# Patient Record
Sex: FEMALE | Race: BLACK OR AFRICAN AMERICAN
[De-identification: names, ages, dates, MRNs, and addresses within clinical notes are randomized per-mention and may not be internally consistent; named-entity substitution may affect disease eponyms.]

---

## 2017-07-13 ENCOUNTER — HOSPITAL ENCOUNTER (OUTPATIENT)
Dept: HOSPITAL 61 - MAMMO | Age: 67
Discharge: HOME | End: 2017-07-13
Attending: INTERNAL MEDICINE
Payer: MEDICARE

## 2017-07-13 DIAGNOSIS — Z12.31: Primary | ICD-10-CM

## 2017-07-14 NOTE — RAD
DATE: 7/13/2017



EXAM: DIGITAL SCREEN BILAT W/CAD



HISTORY: Routine screening



COMPARISON: 7/12/2016



This study was interpreted with the benefit of Computerized Aided Detection

(CAD).





The breast parenchyma is heterogeneously dense, which could reduce sensitivity

of mammography. Breast parenchyma level C.





FINDINGS: There is an unchanged nodule anterolaterally in the left compatible

with an intramammary lymph node. No new or enlarging breast densities are

seen. No suspicious microcalcifications have developed.  





IMPRESSION: Stable mammograms without evidence of malignancy.





BI-RADS CATEGORY: 2 BENIGN FINDING(S)



RECOMMENDED FOLLOW-UP: 12M 12 MONTH FOLLOW-UP



PQRS compliance statement: Patient information was entered into a reminder

system with a target due date     for the next mammogram.



Mammography is a sensitive method for finding small breast cancers, but it

does not detect them all and is not a substitute for careful clinical

examination.  A negative mammogram does not negate a clinically suspicious

finding and should not result in delay in biopsying a clinically suspicious

abnormality.



"Our facility is accredited by the [employer] Mammography

Program."

## 2018-07-17 ENCOUNTER — HOSPITAL ENCOUNTER (OUTPATIENT)
Dept: HOSPITAL 61 - MAMMO | Age: 68
Discharge: HOME | End: 2018-07-17
Attending: INTERNAL MEDICINE
Payer: MEDICARE

## 2018-07-17 DIAGNOSIS — Z12.31: Primary | ICD-10-CM

## 2018-07-17 PROCEDURE — 77067 SCR MAMMO BI INCL CAD: CPT

## 2019-08-14 ENCOUNTER — HOSPITAL ENCOUNTER (OUTPATIENT)
Dept: HOSPITAL 61 - MAMMO | Age: 69
Discharge: HOME | End: 2019-08-14
Attending: FAMILY MEDICINE
Payer: MEDICARE

## 2019-08-14 DIAGNOSIS — N64.89: ICD-10-CM

## 2019-08-14 DIAGNOSIS — N63.22: Primary | ICD-10-CM

## 2019-08-14 PROCEDURE — 77065 DX MAMMO INCL CAD UNI: CPT

## 2019-08-14 PROCEDURE — 76641 ULTRASOUND BREAST COMPLETE: CPT

## 2020-02-26 ENCOUNTER — HOSPITAL ENCOUNTER (OUTPATIENT)
Dept: HOSPITAL 61 - MAMMO | Age: 70
End: 2020-02-26
Attending: FAMILY MEDICINE
Payer: MEDICARE

## 2020-02-26 DIAGNOSIS — N63.20: ICD-10-CM

## 2020-02-26 DIAGNOSIS — N60.42: Primary | ICD-10-CM

## 2020-02-26 DIAGNOSIS — R92.8: ICD-10-CM

## 2020-02-26 PROCEDURE — 77065 DX MAMMO INCL CAD UNI: CPT

## 2020-02-26 PROCEDURE — 76641 ULTRASOUND BREAST COMPLETE: CPT

## 2020-02-26 NOTE — RAD
Examination:

1. Left digital diagnostic mammogram.

2. Limited left breast ultrasound.



INDICATION: 69-year-old woman presents for short-term follow-up of probably

benign asymmetry and nodularity in the left breast.



COMPARISON: Screening mammograms of 7/22/2019, 7/17/2018, 7/13/2017, and

2/6/2014, and left diagnostic mammogram and targeted breast ultrasound of

8/14/2019.



FINDINGS:



Additional left diagnostic mammographic views show extremely dense breast

parenchyma with a questionable asymmetry in the superior left breast at the

12:00 position 5 cm from the nipple on the MLO view only. This could coincide

with the asymmetry recalled from screening last year. It is better visualized

on the 3-D screening mammographic images of 7/22/2019 (CC image 18 of 45 and

MLO image 19 of 47).



Targeted ultrasound of the left breast confirm the presence of an oval mass in

parallel orientation of the left 9:00 position 4 cm from the nipple measuring

2 cm in length with an echogenic linear center, compatible with a benign mass

such as a hamartoma. With focused retrospective review, it is not

significantly changed in at least 6 years. This is consistent with a benign

etiology and requires no further follow-up.



Targeted ultrasound in the lateral left breast identifies a 6 mm oval mass at

the left 3:00 position 5 cm from the nipple that also shows mammographic

stability dating back to 2014 and is most compatible with a benign

intramammary lymph node. This also requires no additional follow-up in the

absence of any clinically suspicious features.



Review of the tomographic screening images suggested a focal asymmetry that

was difficult to see on 2-D imaging so targeted ultrasound of the left breast

at the 12:00 position 5 cm from the nipple was also pursued and vague,

ill-defined hypoechoic tissue embedded within the otherwise echogenic dense

fibroglandular tissue was identified. This measures 9 mm on ultrasound but is

best seen on 3-D mammography as described above. Since 3-D tomographic imaging

guided biopsy is not immediately available, ultrasound-guided core needle

biopsy is an acceptable alternate imaging guided modality for tissue sampling.

This area is mildly suspicious in correlation with the mammographic appearance

and therefore tissue sampling with ultrasound-guided biopsy is recommended.

Discussed with the patient.



Incidental benign duct ectasia is also identified in the retroareolar region

with scattered intraductal debris. Representative images were acquired at the

12:00 position. Sonographic survey of the left axilla revealed no adenopathy.



IMPRESSION:



Suspicious 9 mm asymmetry in the superior left breast at the 12:00 position 5

cm from the nipple.

This is of low suspicion for malignancy but ultrasound-guided core needle

biopsy is recommended for histologic confirmation.

Discussed with patient. I also left a voice mail with the patient's referring

physician's nurse with request for a return call to confirm message receipt.

We will need an order from Dr. Hand's office in order to proceed with

scheduling the biopsy. Our breast imaging staff will assist with facilitating

follow-up.



BI-RADS Category 4

Findings suspicious for malignancy.

Biopsy recommended.

## 2020-03-03 ENCOUNTER — HOSPITAL ENCOUNTER (OUTPATIENT)
Dept: HOSPITAL 61 - US | Age: 70
End: 2020-03-03
Attending: FAMILY MEDICINE
Payer: MEDICARE

## 2020-03-03 DIAGNOSIS — N63.20: Primary | ICD-10-CM

## 2020-03-03 DIAGNOSIS — N60.32: ICD-10-CM

## 2020-03-03 PROCEDURE — 77065 DX MAMMO INCL CAD UNI: CPT

## 2020-03-03 PROCEDURE — 76942 ECHO GUIDE FOR BIOPSY: CPT

## 2020-03-03 PROCEDURE — 19083 BX BREAST 1ST LESION US IMAG: CPT

## 2020-03-03 PROCEDURE — 88305 TISSUE EXAM BY PATHOLOGIST: CPT

## 2020-03-03 NOTE — RAD
Examination:

1. Ultrasound-guided left breast core needle biopsy

2. Left postprocedure mammogram.



INDICATION: 69-year-old woman initially recalled from screening was eventually

recommended for ultrasound-guided core needle biopsy after an initial probably

benign diagnostic evaluation.



COMPARISON: Mammograms of 7/22/2019, 8/14/2019, 2/26/2020, and left diagnostic

breast ultrasound of 2/26/2020.



FINDINGS:

Informed consent was obtained and an appropriate procedural pause observed.

Using standard sterile technique, ultrasound guidance and local anesthesia,

multiple 14-gauge core biopsy samples of the left breast at the 12:00 position

5 cm from the nipple were obtained in 4 passes. Samples were laced in formalin

and the S shaped biopsy marker was deployed at the biopsy site. Hemostasis

assured with direct breast compression for 10 minutes and a postprocedure

mammogram was obtained.



Digital left postprocedure mammogram showed satisfactory deployment of the S

shaped biopsy marker at the superior middle third left breast with no

postbiopsy hematoma.



Patient tolerated the procedure without incident. There were no apparent

complications. Puncture site was dressed and postprocedure instructions were

reviewed prior to patient discharge from the imaging suite.



IMPRESSION:

Ultrasound-guided left breast core needle biopsy of an asymmetry of the

superior left breast 12:00 position 5 cm from the nipple within extremely

dense fibroglandular tissue. Pathology results are pending. An addendum will

be issued once pathology results become available.

## 2020-03-04 NOTE — PATHOLOGY
[facility] Accession Number: [id number]

.                                                                01

Material submitted:                                        .

breast - LEFT BREAST MASS 12:00 5CMFN 9MM. Modifiers: left, 12:00

.                                                                01

Clinical history:                                          .

Breast mass

.                                                                02

**********************************************************************

Diagnosis:

Breast tissue, left breast mass 12:00 needle biopsies:

- Nodular stromal fibrosis.

.

(JPM:alphonse; 03/04/2020)

QMS  03/04/2020  1320 Local

**********************************************************************

.                                                                02

Comment:

Sections of the left breast mass at 12:00 needle biopsy reveal segments of

breast tissue showing a fairly well demarcated nodular area of stromal

fibrosis.  There are a few scattered small terminal ducts and lobules

present.  There is no atypia or evidence of malignancy.

(JPM:alphonse; 03/04/2020)

.                                                                02

Electronically signed:                                     .

Alexi Rosenbaum MD, Pathologist

NPI- [id number]

.                                                                01

Gross description:                                         .

The specimen is received in formalin, labeled "Belen Fajardo, left breast".

The specimen is additionally labeled on the requisition as, "left breast

mass 12:00 5 cm from nipple 9 mm".  Received are multiple needle cores of

fibrofatty tissue measuring 1.4 x 1.0 x 0.2 cm in aggregate dimensions.

The specimen is submitted entirely in cassettes A1 through A3.  The cold

ischemic time and time in formalin are not provided.  The time out of

formalin is 11:40 p.m. on 3/3/2020.

(CAA; 3/3/2020)

QAC/QAC  03/03/2020  1637 Local

.                                                                02

Pathologist provided ICD-10:

N60.32

.                                                                02

CPT                                                        .

832158

Specimen Comment: A courtesy copy of this report has been sent to [phone], [phone]

Specimen Comment: [id number]

Specimen Comment: Report sent to  / DR CUELLAR

***Performed at:  01

   [facility], [place]  [id number]

   MD Neo Burrows MD Phone:  [phone]

***Performed at:  02

   [facility]

   [address] [place], [place]  [id number]

   MD Alexi Rosenbaum MD Phone:  [phone]
[place]ever